# Patient Record
Sex: FEMALE | Race: BLACK OR AFRICAN AMERICAN | Employment: FULL TIME | URBAN - METROPOLITAN AREA
[De-identification: names, ages, dates, MRNs, and addresses within clinical notes are randomized per-mention and may not be internally consistent; named-entity substitution may affect disease eponyms.]

---

## 2017-08-09 ENCOUNTER — OFFICE VISIT (OUTPATIENT)
Dept: FAMILY MEDICINE CLINIC | Age: 42
End: 2017-08-09

## 2017-08-09 VITALS
WEIGHT: 128 LBS | HEIGHT: 64 IN | TEMPERATURE: 98.5 F | HEART RATE: 78 BPM | DIASTOLIC BLOOD PRESSURE: 70 MMHG | OXYGEN SATURATION: 98 % | RESPIRATION RATE: 16 BRPM | SYSTOLIC BLOOD PRESSURE: 102 MMHG | BODY MASS INDEX: 21.85 KG/M2

## 2017-08-09 DIAGNOSIS — L93.0 DISCOID LUPUS ERYTHEMATOSUS: Primary | ICD-10-CM

## 2017-08-09 DIAGNOSIS — D68.61 ANTI-PHOSPHOLIPID ANTIBODY SYNDROME (HCC): ICD-10-CM

## 2017-08-09 DIAGNOSIS — K31.84 GASTROPARESIS: ICD-10-CM

## 2017-08-09 DIAGNOSIS — K21.9 GASTROESOPHAGEAL REFLUX DISEASE WITHOUT ESOPHAGITIS: ICD-10-CM

## 2017-08-09 NOTE — LETTER
NOTIFICATION RETURN TO WORK / SCHOOL 
 
8/9/2017 12:25 PM 
 
Ms. Patti Gandhi [address] [address] To Whom It May Concern: 
 
Patti Gandhi is currently under the care of [address] [place] [address]. She was seen in our office today. Patient is being referred out of our office for evaluation and treatment for medical issues. At this time patient to be placed on light duty until further notice. If there are questions or concerns please have the patient contact our office. Sincerely, Meredith Hurley MD

## 2017-08-09 NOTE — LETTER
NOTIFICATION RETURN TO WORK / SCHOOL 
 
8/9/2017 12:20 PM 
 
Ms. Jeannette Sylvester [address] [address] To Whom It May Concern: 
 
Jeannette Sylvester is currently under the care of [address] [place] [address]. She has been seen our office today, Patient is being referred out of our office for evaluation and treatment for gastro issues. Patient may need to be put on light duty. If there are questions or concerns please have the patient contact our office. Sincerely, Eleanor Renteria MD

## 2017-08-09 NOTE — PROGRESS NOTES
Jeannette Sylvester is a 39 y.o. female  Here today with complaints of weight loss. 1. Have you been to the ER, urgent care clinic since your last visit? Hospitalized since your last visit? No    2. Have you seen or consulted any other health care providers outside of the [address] [place] [address] since your last visit? Include any pap smears or colon screening.  No

## 2017-08-09 NOTE — LETTER
NOTIFICATION RETURN TO WORK / SCHOOL 
 
8/9/2017 2:03 PM 
 
Ms. Severino Verde [address] [address] To Whom It May Concern: 
 
Severino Verde is currently under the care of [address] [place] [address]. She was seen in our office today. Patient is being referred out of our office for evaluation and treatment for medical issues. At this time patient is not to have customer contact until 08/17/17. If there are questions or concerns please have the patient contact our office. Sincerely, Lauro Hartman MD

## 2017-08-09 NOTE — PROGRESS NOTES
Assessment/Plan:    *Diagnoses and all orders for this visit:    1. Discoid lupus erythematosus  -     REFERRAL TO RHEUMATOLOGY  -     CBC WITH AUTOMATED DIFF; Future  -     HEPATIC FUNCTION PANEL; Future  -     LIPID PANEL; Future  -     METABOLIC PANEL, BASIC; Future  -     TSH 3RD GENERATION; Future  -     T4, FREE; Future  -     URINALYSIS W/ RFLX MICROSCOPIC; Future  -     VITAMIN D, 25 HYDROXY; Future    2. Anti-phospholipid antibody syndrome (HCC)  -     REFERRAL TO RHEUMATOLOGY    3. Gastroesophageal reflux disease without esophagitis  -     REFERRAL TO GASTROENTEROLOGY  -     CBC WITH AUTOMATED DIFF; Future  -     HEPATIC FUNCTION PANEL; Future  -     LIPID PANEL; Future  -     METABOLIC PANEL, BASIC; Future  -     TSH 3RD GENERATION; Future  -     T4, FREE; Future  -     URINALYSIS W/ RFLX MICROSCOPIC; Future  -     VITAMIN D, 25 HYDROXY; Future    4. Gastroparesis  -     REFERRAL TO GASTROENTEROLOGY  -     CBC WITH AUTOMATED DIFF; Future  -     HEPATIC FUNCTION PANEL; Future  -     LIPID PANEL; Future  -     METABOLIC PANEL, BASIC; Future  -     TSH 3RD GENERATION; Future  -     T4, FREE; Future  -     URINALYSIS W/ RFLX MICROSCOPIC; Future  -     VITAMIN D, 25 HYDROXY; Future        At the end of the visit, she produced a form that needs completion for her job. She works for [employer] and when her health issues are acting up, she finds it difficult to function in her role at work. Is asking for a letter stating that she may need light duty. The plan was discussed with the patient. The patient verbalized understanding and is in agreement with the plan.   All medication potential side effects were discussed with the patient.    -------------------------------------------------------------------------------------------------------------------        Linda Sanchez is a 39 y.o. female and presents with Weight Loss and Decreased Appetite         Subjective:  Pt returns to us after last being seen in Mar 2016.   She has been struggling with unintentional weight loss, poor appetite, +nausea and all has been more noticeable the last 3 months. She has prior dx of gastroparesis and reflux. She has not been seeing a gastroenterologist at all, in fact we referred her in Oct 2015 and she never followed through. She can not tolerate much food. She feels like she has to force herself to eat b/c otherwise, she would rather not eat. Also has dx of Lupus. Has started feeling tired, aching in her joints and is concerned her Lupus is \"acting up\". ROS:  Constitutional: No recent weight change. No weakness/fatigue. No f/c. Skin: No rashes, change in nails/hair, itching   HENT: No HA, dizziness. No hearing loss/tinnitus. No nasal congestion/discharge. Eyes: No change in vision, double/blurred vision or eye pain/redness. Cardiovascular: No CP/palpitations. No MARTIN/orthopnea/PND. Respiratory: No cough/sputum, dyspnea, wheezing. Gastointestinal: No dysphagia, reflux. No n/v. No constipation/diarrhea. No melena/rectal bleeding. Genitourinary: No dysuria, urinary hesitancy, nocturia, hematuria. No incontinence. Musculoskeletal: No joint pain/stiffness. No muscle pain/tenderness. Endo: No heat/cold intolerance, no polyuria/polydypsia. Heme: No h/o anemia. No easy bleeding/bruising. Allergy/Immunology: No seasonal rhinitis. Denies frequent colds, sinus/ear infections. Neurological: No seizures/numbness/weakness. No paresthesias. Psychiatric:  No depression, anxiety. The problem list was updated as a part of today's visit. Patient Active Problem List   Diagnosis Code    Discoid lupus erythematosus L93.0    GERD (gastroesophageal reflux disease) K21.9    Anti-phospholipid antibody syndrome (HCC) D68.61    Gastroparesis K31.84       The PSH, FH were reviewed.         SH:  Social History   Substance Use Topics    Smoking status: Former Smoker     Packs/day: 0.25     Types: Cigarettes Start date: 1/28/2009     Quit date: 1/28/2011    Smokeless tobacco: Never Used    Alcohol use Yes      Comment: socially       Medications/Allergies:  Current Outpatient Prescriptions on File Prior to Visit   Medication Sig Dispense Refill    HYDROcodone-acetaminophen ([place]) 5-325 mg per tablet Take  by mouth every six (6) hours as needed for Pain.  cyclobenzaprine (FLEXERIL) 10 mg tablet Take  by mouth nightly.  tobramycin (TOBREX) 0.3 % ophthalmic solution Administer 1 Drop to both eyes every six (6) hours. Indications: BACTERIAL CONJUNCTIVITIS 1 Bottle 0    omeprazole (PRILOSEC) 40 mg capsule Take 1 Cap by mouth daily. 30 Cap 5    ergocalciferol (ERGOCALCIFEROL) 50,000 unit capsule Take 1 Cap by mouth every seven (7) days. 16 Cap 0    guaiFENesin-codeine (ROBITUSSIN AC)  mg/5 mL solution Take 5 mL by mouth three (3) times daily as needed for Cough. 120 mL 0     No current facility-administered medications on file prior to visit. Allergies   Allergen Reactions    Ibuprofen Rash         Health Maintenance:   Health Maintenance   Topic Date Due    DTaP/Tdap/Td series (1 - Tdap) 12/14/1996    PAP AKA CERVICAL CYTOLOGY  12/14/1996    INFLUENZA AGE 9 TO ADULT  08/01/2017       Objective:  Visit Vitals    /70    Pulse 78    Temp 98.5 °F (36.9 °C)    Resp 16    Ht 5' 4\" (1.626 m)    Wt 128 lb (58.1 kg)    LMP 07/17/2017    SpO2 98%    BMI 21.97 kg/m2          Nurses notes and VS reviewed.       Physical Examination: General appearance - alert, well appearing, and in no distress  Chest - clear to auscultation, no wheezes, rales or rhonchi, symmetric air entry  Heart - normal rate, regular rhythm, normal S1, S2, no murmurs, rubs, clicks or gallops  Abdomen - soft, nontender, nondistended, no masses or organomegaly        Labwork and Ancillary Studies:    CBC w/Diff  Lab Results   Component Value Date/Time    WBC 5.8 11/02/2015 11:57 AM    HGB 12.0 11/02/2015 11:57 AM PLATELET 847 95/39/1547 11:57 AM         Basic Metabolic Profile  Lab Results   Component Value Date/Time    Sodium 140 11/02/2015 11:57 AM    Potassium 4.0 11/02/2015 11:57 AM    Chloride 103 11/02/2015 11:57 AM    CO2 24 11/02/2015 11:57 AM    Anion gap 13.0 11/02/2015 11:57 AM    Glucose 88 11/02/2015 11:57 AM    BUN 9 11/02/2015 11:57 AM    Creatinine 0.5 11/02/2015 11:57 AM    Calcium 9.3 11/02/2015 11:57 AM         LFT  Lab Results   Component Value Date/Time    ALT (SGPT) 12 11/02/2015 11:57 AM    AST (SGOT) 15 11/02/2015 11:57 AM    Alk.  phosphatase 48 11/02/2015 11:57 AM    Bilirubin, direct <0.2 11/02/2015 11:57 AM    Bilirubin, total 0.2 11/02/2015 11:57 AM         Cholesterol  Lab Results   Component Value Date/Time    Cholesterol, total 168 11/02/2015 11:57 AM    HDL Cholesterol 63 11/02/2015 11:57 AM    LDL, calculated 95 11/02/2015 11:57 AM    Triglyceride 52 11/02/2015 11:57 AM

## 2017-08-10 ENCOUNTER — HOSPITAL ENCOUNTER (OUTPATIENT)
Dept: LAB | Age: 42
Discharge: HOME | End: 2017-08-10
Payer: COMMERCIAL

## 2017-08-10 DIAGNOSIS — L93.0 DISCOID LUPUS ERYTHEMATOSUS: ICD-10-CM

## 2017-08-10 DIAGNOSIS — K21.9 GASTROESOPHAGEAL REFLUX DISEASE WITHOUT ESOPHAGITIS: ICD-10-CM

## 2017-08-10 DIAGNOSIS — K31.84 GASTROPARESIS: ICD-10-CM

## 2017-08-10 DIAGNOSIS — D68.61 ANTI-PHOSPHOLIPID ANTIBODY SYNDROME (HCC): ICD-10-CM

## 2017-08-10 DIAGNOSIS — D68.61 ANTI-PHOSPHOLIPID ANTIBODY SYNDROME (HCC): Primary | ICD-10-CM

## 2017-08-10 LAB
25(OH)D3 SERPL-MCNC: 18.8 NG/ML (ref 30–100)
ALBUMIN SERPL BCP-MCNC: 3.8 G/DL (ref 3.4–5)
ALBUMIN/GLOB SERPL: 0.9 {RATIO} (ref 0.8–1.7)
ALP SERPL-CCNC: 55 U/L (ref 45–117)
ALT SERPL-CCNC: 31 U/L (ref 13–56)
ANION GAP BLD CALC-SCNC: 7 MMOL/L (ref 3–18)
APPEARANCE UR: ABNORMAL
AST SERPL W P-5'-P-CCNC: 16 U/L (ref 15–37)
BACTERIA URNS QL MICRO: ABNORMAL /HPF
BASOPHILS # BLD AUTO: 0 K/UL (ref 0–0.06)
BASOPHILS # BLD: 1 % (ref 0–2)
BILIRUB DIRECT SERPL-MCNC: <0.1 MG/DL (ref 0–0.2)
BILIRUB SERPL-MCNC: 0.2 MG/DL (ref 0.2–1)
BILIRUB UR QL: NEGATIVE
BUN SERPL-MCNC: 9 MG/DL (ref 7–18)
BUN/CREAT SERPL: 13 (ref 12–20)
CALCIUM SERPL-MCNC: 9.1 MG/DL (ref 8.5–10.1)
CHLORIDE SERPL-SCNC: 102 MMOL/L (ref 100–108)
CHOLEST SERPL-MCNC: 148 MG/DL
CO2 SERPL-SCNC: 28 MMOL/L (ref 21–32)
COLOR UR: YELLOW
CREAT SERPL-MCNC: 0.68 MG/DL (ref 0.6–1.3)
DIFFERENTIAL METHOD BLD: ABNORMAL
EOSINOPHIL # BLD: 0.1 K/UL (ref 0–0.4)
EOSINOPHIL NFR BLD: 2 % (ref 0–5)
EPITH CASTS URNS QL MICRO: ABNORMAL /LPF (ref 0–5)
ERYTHROCYTE [DISTWIDTH] IN BLOOD BY AUTOMATED COUNT: 17.9 % (ref 11.6–14.5)
GLOBULIN SER CALC-MCNC: 4.1 G/DL (ref 2–4)
GLUCOSE SERPL-MCNC: 83 MG/DL (ref 74–99)
GLUCOSE UR STRIP.AUTO-MCNC: NEGATIVE MG/DL
HCT VFR BLD AUTO: 35.7 % (ref 35–45)
HDLC SERPL-MCNC: 52 MG/DL (ref 40–60)
HDLC SERPL: 2.8 {RATIO} (ref 0–5)
HGB BLD-MCNC: 10.6 G/DL (ref 12–16)
HGB UR QL STRIP: ABNORMAL
KETONES UR QL STRIP.AUTO: NEGATIVE MG/DL
LDLC SERPL CALC-MCNC: 86.4 MG/DL (ref 0–100)
LEUKOCYTE ESTERASE UR QL STRIP.AUTO: ABNORMAL
LIPID PROFILE,FLP: NORMAL
LYMPHOCYTES # BLD AUTO: 29 % (ref 21–52)
LYMPHOCYTES # BLD: 1.9 K/UL (ref 0.9–3.6)
MCH RBC QN AUTO: 22.1 PG (ref 24–34)
MCHC RBC AUTO-ENTMCNC: 29.7 G/DL (ref 31–37)
MCV RBC AUTO: 74.4 FL (ref 74–97)
MONOCYTES # BLD: 0.7 K/UL (ref 0.05–1.2)
MONOCYTES NFR BLD AUTO: 11 % (ref 3–10)
MUCOUS THREADS URNS QL MICRO: ABNORMAL /LPF
NEUTS SEG # BLD: 3.7 K/UL (ref 1.8–8)
NEUTS SEG NFR BLD AUTO: 57 % (ref 40–73)
NITRITE UR QL STRIP.AUTO: NEGATIVE
PH UR STRIP: 5.5 [PH] (ref 5–8)
PLATELET # BLD AUTO: 278 K/UL (ref 135–420)
PMV BLD AUTO: 11.3 FL (ref 9.2–11.8)
POTASSIUM SERPL-SCNC: 4.2 MMOL/L (ref 3.5–5.5)
PROT SERPL-MCNC: 7.9 G/DL (ref 6.4–8.2)
PROT UR STRIP-MCNC: ABNORMAL MG/DL
RBC # BLD AUTO: 4.8 M/UL (ref 4.2–5.3)
RBC #/AREA URNS HPF: ABNORMAL /HPF (ref 0–5)
SODIUM SERPL-SCNC: 137 MMOL/L (ref 136–145)
SP GR UR REFRACTOMETRY: 1.03 (ref 1–1.03)
T4 FREE SERPL-MCNC: 1.1 NG/DL (ref 0.7–1.5)
TRIGL SERPL-MCNC: 48 MG/DL (ref ?–150)
TSH SERPL DL<=0.05 MIU/L-ACNC: 1 UIU/ML (ref 0.36–3.74)
UROBILINOGEN UR QL STRIP.AUTO: 0.2 EU/DL (ref 0.2–1)
VLDLC SERPL CALC-MCNC: 9.6 MG/DL
WBC # BLD AUTO: 6.4 K/UL (ref 4.6–13.2)
WBC URNS QL MICRO: ABNORMAL /HPF (ref 0–4)

## 2017-08-10 PROCEDURE — 80076 HEPATIC FUNCTION PANEL: CPT | Performed by: INTERNAL MEDICINE

## 2017-08-10 PROCEDURE — 86225 DNA ANTIBODY NATIVE: CPT | Performed by: INTERNAL MEDICINE

## 2017-08-10 PROCEDURE — 86235 NUCLEAR ANTIGEN ANTIBODY: CPT | Performed by: INTERNAL MEDICINE

## 2017-08-10 PROCEDURE — 80048 BASIC METABOLIC PNL TOTAL CA: CPT | Performed by: INTERNAL MEDICINE

## 2017-08-10 PROCEDURE — 85025 COMPLETE CBC W/AUTO DIFF WBC: CPT | Performed by: INTERNAL MEDICINE

## 2017-08-10 PROCEDURE — 82306 VITAMIN D 25 HYDROXY: CPT | Performed by: INTERNAL MEDICINE

## 2017-08-10 PROCEDURE — 84439 ASSAY OF FREE THYROXINE: CPT | Performed by: INTERNAL MEDICINE

## 2017-08-10 PROCEDURE — 36415 COLL VENOUS BLD VENIPUNCTURE: CPT | Performed by: INTERNAL MEDICINE

## 2017-08-10 PROCEDURE — 84443 ASSAY THYROID STIM HORMONE: CPT | Performed by: INTERNAL MEDICINE

## 2017-08-10 PROCEDURE — 86146 BETA-2 GLYCOPROTEIN ANTIBODY: CPT | Performed by: INTERNAL MEDICINE

## 2017-08-10 PROCEDURE — 80061 LIPID PANEL: CPT | Performed by: INTERNAL MEDICINE

## 2017-08-10 RX ORDER — OMEPRAZOLE 40 MG/1
40 CAPSULE, DELAYED RELEASE ORAL DAILY
Qty: 30 CAP | Refills: 5 | Status: SHIPPED | OUTPATIENT
Start: 2017-08-10

## 2017-08-11 LAB
CENTROMERE B AB SER-ACNC: <0.2 AI (ref 0–0.9)
CHROMATIN AB SERPL-ACNC: 0.3 AI (ref 0–0.9)
DSDNA AB SER-ACNC: <1 IU/ML (ref 0–9)
DSDNA AB SER-ACNC: <1 IU/ML (ref 0–9)
ENA JO1 AB SER-ACNC: <0.2 AI (ref 0–0.9)
ENA RNP AB SER-ACNC: 0.5 AI (ref 0–0.9)
ENA SCL70 AB SER-ACNC: <0.2 AI (ref 0–0.9)
ENA SM AB SER-ACNC: 0.2 AI (ref 0–0.9)
ENA SM AB SER-ACNC: <0.2 AI (ref 0–0.9)
ENA SM+RNP AB SER-ACNC: 0.4 AI (ref 0–0.9)
ENA SS-A AB SER-ACNC: 0.5 AI (ref 0–0.9)
ENA SS-B AB SER-ACNC: <0.2 AI (ref 0–0.9)
RIBOSOMAL P AB SER-ACNC: <0.2 AI (ref 0–0.9)
SEE BELOW:, 164879: NORMAL

## 2017-08-14 NOTE — PROGRESS NOTES
Notify pt I am still waiting on the anti-phospholipid results but her Lupus labs appear to be in normal range. Her Vit d is low. Start RX Vit d 50,000 units q week x 4 months THEN transition to OTC 1000 units daily. Pt is also anemic. Should start some iron, ferrous sulfate 325 mg BID. We have referred her to rheumatology and GI. Please send these labs to them.

## 2017-08-14 NOTE — PROGRESS NOTES
Has some blood in urine with WBCs and bacteria. I would like her on an Abx x 7 days, Cipro 250 mg BID.

## 2017-08-15 RX ORDER — ERGOCALCIFEROL 1.25 MG/1
50000 CAPSULE ORAL
Qty: 4 CAP | Refills: 3 | Status: SHIPPED | OUTPATIENT
Start: 2017-08-15 | End: 2017-08-17 | Stop reason: SDUPTHER

## 2017-08-15 RX ORDER — PROMETHAZINE HYDROCHLORIDE 25 MG/1
25 TABLET ORAL
Qty: 40 TAB | Refills: 0 | Status: SHIPPED | OUTPATIENT
Start: 2017-08-15 | End: 2019-02-01

## 2017-08-15 RX ORDER — CIPROFLOXACIN 250 MG/1
250 TABLET, FILM COATED ORAL EVERY 12 HOURS
Qty: 14 TAB | Refills: 0 | Status: SHIPPED | OUTPATIENT
Start: 2017-08-15 | End: 2017-08-22

## 2017-08-15 RX ORDER — FERROUS SULFATE 325(65) MG
325 TABLET, DELAYED RELEASE (ENTERIC COATED) ORAL 2 TIMES DAILY
Qty: 60 TAB | Refills: 2 | Status: SHIPPED | OUTPATIENT
Start: 2017-08-15 | End: 2019-02-01

## 2017-08-15 NOTE — TELEPHONE ENCOUNTER
Patient is having issue's with nausea and it is hard for her to eat anything is following a bland diet

## 2017-08-17 ENCOUNTER — HOSPITAL ENCOUNTER (OUTPATIENT)
Dept: LAB | Age: 42
Discharge: HOME | End: 2017-08-17
Payer: COMMERCIAL

## 2017-08-17 ENCOUNTER — OFFICE VISIT (OUTPATIENT)
Dept: FAMILY MEDICINE CLINIC | Age: 42
End: 2017-08-17

## 2017-08-17 VITALS
BODY MASS INDEX: 22.09 KG/M2 | TEMPERATURE: 97.9 F | HEIGHT: 64 IN | RESPIRATION RATE: 20 BRPM | HEART RATE: 72 BPM | WEIGHT: 129.4 LBS | SYSTOLIC BLOOD PRESSURE: 98 MMHG | DIASTOLIC BLOOD PRESSURE: 70 MMHG | OXYGEN SATURATION: 99 %

## 2017-08-17 DIAGNOSIS — K31.84 GASTROPARESIS: Primary | ICD-10-CM

## 2017-08-17 DIAGNOSIS — K21.9 GASTROESOPHAGEAL REFLUX DISEASE WITHOUT ESOPHAGITIS: ICD-10-CM

## 2017-08-17 DIAGNOSIS — D68.61 ANTI-PHOSPHOLIPID ANTIBODY SYNDROME (HCC): ICD-10-CM

## 2017-08-17 DIAGNOSIS — L93.0 DISCOID LUPUS ERYTHEMATOSUS: ICD-10-CM

## 2017-08-17 DIAGNOSIS — D68.61 ANTI-PHOSPHOLIPID ANTIBODY SYNDROME (HCC): Primary | ICD-10-CM

## 2017-08-17 PROCEDURE — 86146 BETA-2 GLYCOPROTEIN ANTIBODY: CPT | Performed by: INTERNAL MEDICINE

## 2017-08-17 PROCEDURE — 36415 COLL VENOUS BLD VENIPUNCTURE: CPT | Performed by: INTERNAL MEDICINE

## 2017-08-17 NOTE — PROGRESS NOTES
Chief Complaint   Patient presents with    Other     Discoid lupus erythematosus     Letter     ADA       1. Have you been to the ER, urgent care clinic since your last visit? Hospitalized since your last visit? No    2. Have you seen or consulted any other health care providers outside of the [address] [place] [address] since your last visit? Include any pap smears or colon screening.  No

## 2017-08-17 NOTE — PROGRESS NOTES
Checked with [facility] they didn't receive correct collection of plasma patient has returned to lab for recollection

## 2017-08-17 NOTE — PROGRESS NOTES
Can you just check with lab, why does it say \"test not performed\"?   Are they still running the test?

## 2017-08-17 NOTE — PROGRESS NOTES
Assessment/Plan:    *Diagnoses and all orders for this visit:    1. Gastroparesis    2. Gastroesophageal reflux disease without esophagitis    3. Discoid lupus erythematosus    4. Anti-phospholipid antibody syndrome (HCC)      Form was completed. Patient still needed to complete her part so took it with her. The plan was discussed with the patient. The patient verbalized understanding and is in agreement with the plan. All medication potential side effects were discussed with the patient.    -------------------------------------------------------------------------------------------------------------------        Anuj Bundy is a 39 y.o. female and presents with Other (Discoid lupus erythematosus ) and Letter (ADA)         Subjective:  Pt returns today to have paperwork completed. She has not been feeling well, dealing with gastroparesis, reflux and Lupus. 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 She needs the form to support her need to be on lighter duty, to avoid her having direct customer contact. ROS:  Constitutional: No recent weight change. No weakness/fatigue. No f/c. Skin: No rashes, change in nails/hair, itching   HENT: No HA, dizziness. No hearing loss/tinnitus. No nasal congestion/discharge. Eyes: No change in vision, double/blurred vision or eye pain/redness. Cardiovascular: No CP/palpitations. No MARTIN/orthopnea/PND. Respiratory: No cough/sputum, dyspnea, wheezing. Gastointestinal: No dysphagia, reflux. No n/v. No constipation/diarrhea. No melena/rectal bleeding. Genitourinary: No dysuria, urinary hesitancy, nocturia, hematuria. No incontinence. Musculoskeletal: No joint pain/stiffness. No muscle pain/tenderness. Endo: No heat/cold intolerance, no polyuria/polydypsia. Heme: No h/o anemia. No easy bleeding/bruising. Allergy/Immunology: No seasonal rhinitis.  Denies frequent colds, sinus/ear infections. Neurological: No seizures/numbness/weakness. No paresthesias. Psychiatric:  No depression, anxiety. The problem list was updated as a part of today's visit. Patient Active Problem List   Diagnosis Code    Discoid lupus erythematosus L93.0    GERD (gastroesophageal reflux disease) K21.9    Anti-phospholipid antibody syndrome (HCC) D68.61    Gastroparesis K31.84       The PSH, FH were reviewed. SH:  Social History   Substance Use Topics    Smoking status: Former Smoker     Packs/day: 0.25     Types: Cigarettes     Start date: 1/28/2009     Quit date: 1/28/2011    Smokeless tobacco: Never Used    Alcohol use Yes      Comment: socially       Medications/Allergies:  Current Outpatient Prescriptions on File Prior to Visit   Medication Sig Dispense Refill    ciprofloxacin HCl (CIPRO) 250 mg tablet Take 1 Tab by mouth every twelve (12) hours for 7 days. 14 Tab 0    promethazine (PHENERGAN) 25 mg tablet Take 1 Tab by mouth every eight (8) hours as needed for Nausea. 40 Tab 0    omeprazole (PRILOSEC) 40 mg capsule Take 1 Cap by mouth daily. 30 Cap 5    ergocalciferol (ERGOCALCIFEROL) 50,000 unit capsule Take 1 Cap by mouth every seven (7) days. 16 Cap 0    ferrous sulfate (IRON) 325 mg (65 mg iron) EC tablet Take 1 Tab by mouth two (2) times a day. 60 Tab 2    HYDROcodone-acetaminophen (NORCO) 5-325 mg per tablet Take  by mouth every six (6) hours as needed for Pain.  cyclobenzaprine (FLEXERIL) 10 mg tablet Take  by mouth nightly.  tobramycin (TOBREX) 0.3 % ophthalmic solution Administer 1 Drop to both eyes every six (6) hours. Indications: BACTERIAL CONJUNCTIVITIS 1 Bottle 0     No current facility-administered medications on file prior to visit.          Allergies   Allergen Reactions    Ibuprofen Rash         Health Maintenance:   Health Maintenance   Topic Date Due    DTaP/Tdap/Td series (1 - Tdap) 12/14/1996    PAP AKA CERVICAL CYTOLOGY  12/14/1996    INFLUENZA AGE 9 TO ADULT  08/01/2017       Objective:  Visit Vitals    BP 98/70 (BP 1 Location: Right arm, BP Patient Position: Sitting)    Pulse 72    Temp 97.9 °F (36.6 °C) (Oral)    Resp 20    Ht 5' 4\" (1.626 m)    Wt 129 lb 6.4 oz (58.7 kg)    LMP 08/15/2017    SpO2 99%    BMI 22.21 kg/m2          Nurses notes and VS reviewed. Physical Examination: General appearance - alert, well appearing, and in no distress        Labwork and Ancillary Studies:    CBC w/Diff  Lab Results   Component Value Date/Time    WBC 6.4 08/10/2017 10:41 AM    HGB 10.6 08/10/2017 10:41 AM    PLATELET 017 56/07/3431 10:41 AM         Basic Metabolic Profile  Lab Results   Component Value Date/Time    Sodium 137 08/10/2017 10:41 AM    Potassium 4.2 08/10/2017 10:41 AM    Chloride 102 08/10/2017 10:41 AM    CO2 28 08/10/2017 10:41 AM    Anion gap 7 08/10/2017 10:41 AM    Glucose 83 08/10/2017 10:41 AM    BUN 9 08/10/2017 10:41 AM    Creatinine 0.68 08/10/2017 10:41 AM    BUN/Creatinine ratio 13 08/10/2017 10:41 AM    GFR est AA >60 08/10/2017 10:41 AM    GFR est non-AA >60 08/10/2017 10:41 AM    Calcium 9.1 08/10/2017 10:41 AM         LFT  Lab Results   Component Value Date/Time    ALT (SGPT) 31 08/10/2017 10:41 AM    AST (SGOT) 16 08/10/2017 10:41 AM    Alk.  phosphatase 55 08/10/2017 10:41 AM    Bilirubin, direct <0.1 08/10/2017 10:41 AM    Bilirubin, total 0.2 08/10/2017 10:41 AM         Cholesterol  Lab Results   Component Value Date/Time    Cholesterol, total 148 08/10/2017 10:41 AM    HDL Cholesterol 52 08/10/2017 10:41 AM    LDL, calculated 86.4 08/10/2017 10:41 AM    Triglyceride 48 08/10/2017 10:41 AM    CHOL/HDL Ratio 2.8 08/10/2017 10:41 AM

## 2017-08-18 ENCOUNTER — TELEPHONE (OUTPATIENT)
Dept: FAMILY MEDICINE CLINIC | Age: 42
End: 2017-08-18

## 2017-08-18 NOTE — TELEPHONE ENCOUNTER
Patient called needs letter written last time needs dates changed to two weeks out paperwork done yesterday goes to a different department

## 2017-08-23 LAB
APTT HEX PL PPP: 3 SEC
APTT IMM NP PPP: NORMAL SEC
APTT PPP 1:1 SALINE: NORMAL SEC
APTT PPP: 26 SEC
B2 GLYCOPROT1 IGA SER-ACNC: <10 SAU
B2 GLYCOPROT1 IGG SER-ACNC: <10 SGU
B2 GLYCOPROT1 IGM SER-ACNC: <10 SMU
CARDIOLIPIN IGA SER IA-ACNC: <10 APL
CARDIOLIPIN IGG SER IA-ACNC: <10 GPL
CARDIOLIPIN IGM SER IA-ACNC: <10 MPL
CONFIRM DRVVT: NORMAL SEC
LA NT PLATELET PPP: 0 SEC
LAC INTERPRETATION, 502038: NORMAL
PROTHROM IGG SERPL-ACNC: 6 G UNITS
PS IGG SER IA-ACNC: 5 GPS
PS IGM SER IA-ACNC: 10 MPS
SCREEN DRVVT/NORMAL: NORMAL RATIO
SCREEN DRVVT: 27.7 SEC

## 2017-08-23 NOTE — PROGRESS NOTES
Notify pt her anti-phospholipid results are normal.  The rheumatologist will discuss this further with her. Did she make an appt yet?

## 2017-08-24 NOTE — PROGRESS NOTES
Patient notified she voiced understanding scheduled her with rheumatology for tomorrow at 8:15 am dr Fernanda Andrews patient given info in office copy of labs faxed to his office

## 2019-02-01 ENCOUNTER — OFFICE VISIT (OUTPATIENT)
Dept: FAMILY MEDICINE CLINIC | Age: 44
End: 2019-02-01

## 2019-02-01 VITALS
HEIGHT: 64 IN | SYSTOLIC BLOOD PRESSURE: 105 MMHG | DIASTOLIC BLOOD PRESSURE: 66 MMHG | WEIGHT: 123 LBS | RESPIRATION RATE: 18 BRPM | HEART RATE: 80 BPM | TEMPERATURE: 98 F | OXYGEN SATURATION: 92 % | BODY MASS INDEX: 21 KG/M2

## 2019-02-01 DIAGNOSIS — R53.83 MALAISE AND FATIGUE: Primary | ICD-10-CM

## 2019-02-01 DIAGNOSIS — R53.81 MALAISE AND FATIGUE: Primary | ICD-10-CM

## 2019-02-01 NOTE — PROGRESS NOTES
Severino Verde is a 37 y.o. female (: 1975) presenting to address: Chief Complaint Patient presents with  Fatigue  
  patient declined flu shot  
 Headache  
  x few weeks     pain scale 6/10 Vitals:  
 19 1442 BP: 105/66 Pulse: 80 Resp: 18 Temp: 98 °F (36.7 °C) TempSrc: Oral  
SpO2: 92% Weight: 123 lb (55.8 kg) Height: 5' 4\" (1.626 m) PainSc:   6 PainLoc: Head LMP: 2019 Hearing/Vision: No exam data present Learning Assessment:  
 
Learning Assessment 2014 PRIMARY LEARNER Patient PRIMARY LANGUAGE ENGLISH  
LEARNER PREFERENCE PRIMARY DEMONSTRATION  
ANSWERED BY patient RELATIONSHIP SELF Depression Screening: PHQ over the last two weeks 2019 Little interest or pleasure in doing things Not at all Feeling down, depressed, irritable, or hopeless Not at all Total Score PHQ 2 0 Fall Risk Assessment:  
No flowsheet data found. Abuse Screening: No flowsheet data found. Coordination of Care Questionaire: 1. Have you been to the ER, urgent care clinic since your last visit? Hospitalized since your last visit? NO 
 
2. Have you seen or consulted any other health care providers outside of the [address] [place] [address] since your last visit? Include any pap smears or colon screening. [facility] Advanced Directive: 1. Do you have an Advanced Directive? NO 
 
2. Would you like information on Advanced Directives?  NO

## 2019-02-01 NOTE — PATIENT INSTRUCTIONS
Rest, increase fluids Return for lab Schedule lab review and follow up with Dr. Abbi Booker Fatigue: Care Instructions Your Care Instructions Fatigue is a feeling of tiredness, exhaustion, or lack of energy. You may feel fatigue because of too much or not enough activity. It can also come from stress, lack of sleep, boredom, and poor diet. Many medical problems, such as viral infections, can cause fatigue. Emotional problems, especially depression, are often the cause of fatigue. Fatigue is most often a symptom of another problem. Treatment for fatigue depends on the cause. For example, if you have fatigue because you have a certain health problem, treating this problem also treats your fatigue. If depression or anxiety is the cause, treatment may help. Follow-up care is a key part of your treatment and safety. Be sure to make and go to all appointments, and call your doctor if you are having problems. It's also a good idea to know your test results and keep a list of the medicines you take. How can you care for yourself at home? · Get regular exercise. But don't overdo it. Go back and forth between rest and exercise. · Get plenty of rest. 
· Eat a healthy diet. Do not skip meals, especially breakfast. 
· Reduce your use of caffeine, tobacco, and alcohol. Caffeine is most often found in coffee, tea, cola drinks, and chocolate. · Limit medicines that can cause fatigue. This includes tranquilizers and cold and allergy medicines. When should you call for help? Watch closely for changes in your health, and be sure to contact your doctor if: 
  · You have new symptoms such as fever or a rash.  
  · Your fatigue gets worse.  
  · You have been feeling down, depressed, or hopeless. Or you may have lost interest in things that you usually enjoy.  
  · You are not getting better as expected. Where can you learn more? Go to http://trudi-maya.info/. Enter C220 in the search box to learn more about \"Fatigue: Care Instructions. \" Current as of: September 23, 2018 Content Version: 11.9 © 1060-2885 [employer]. Care instructions adapted under license by [employer] (which disclaims liability or warranty for this information). If you have questions about a medical condition or this instruction, always ask your healthcare professional. [address] [address] any warranty or liability for your use of this information.

## 2019-02-01 NOTE — PROGRESS NOTES
HISTORY OF PRESENT ILLNESS Anuj Bundy is a 37 y.o. female. Fatigue The history is provided by the patient and medical records. Associated symptoms include headaches (occipital x 2 weeks). Pertinent negatives include no chest pain and no shortness of breath. Headache Associated symptoms include headaches (occipital x 2 weeks). Pertinent negatives include no chest pain and no shortness of breath. Hypotension The history is provided by the patient and medical records. This is a chronic problem. Episode onset: about 2 weeks ago-noted in surgery office, but previously noted here. Associated symptoms include headaches (occipital x 2 weeks). Pertinent negatives include no chest pain and no shortness of breath. Patient Active Problem List  
Diagnosis Code  Discoid lupus erythematosus L93.0  GERD (gastroesophageal reflux disease) K21.9  Anti-phospholipid antibody syndrome (HCC) D68.61  Gastroparesis K31.84 Current Outpatient Medications:  
  omeprazole (PRILOSEC) 40 mg capsule, Take 1 Cap by mouth daily. , Disp: 30 Cap, Rfl: 5 
  ergocalciferol (ERGOCALCIFEROL) 50,000 unit capsule, Take 1 Cap by mouth every seven (7) days. , Disp: 16 Cap, Rfl: 0 Review of Systems Constitutional: Positive for fatigue and malaise/fatigue (x 2 weeks). HENT: Positive for ear pain (transient right ear pain). Eyes: Negative for blurred vision and photophobia. Respiratory: Negative for shortness of breath. Plerutic pain in December Cardiovascular: Negative for chest pain and palpitations. Reports a blood pressure with a systolic in the 21I measured in her surgeon's office recently Denies orthostatic symptoms Gastrointestinal: Positive for nausea and vomiting. Nausea and vomiting associated with gastroparesis - no change from patient's baseline Neurological: Positive for headaches (occipital x 2 weeks). Negative for dizziness. Hard to concentrate Psychiatric/Behavioral: Negative for depression. The patient is not nervous/anxious and does not have insomnia. Acknowledges increased stress associated with discovery of breast mass, although biopsy shows benign lesion-still concerned about surgery scheduled for excision of the mass Visit Vitals /66 (BP 1 Location: Left arm, BP Patient Position: Sitting) Pulse 80 Temp 98 °F (36.7 °C) (Oral) Resp 18 Ht 5' 4\" (1.626 m) Wt 123 lb (55.8 kg) LMP 01/12/2019 SpO2 92% BMI 21.11 kg/m² Physical Exam  
Constitutional: She is oriented to person, place, and time. She appears well-developed and well-nourished. HENT:  
Head: Normocephalic. Right Ear: Tympanic membrane and ear canal normal.  
Left Ear: Tympanic membrane and ear canal normal.  
Mouth/Throat: Oropharynx is clear and moist.  
Eyes: Conjunctivae and EOM are normal. Pupils are equal, round, and reactive to light. Neck: Neck supple. Cardiovascular: Normal rate, regular rhythm, normal heart sounds and intact distal pulses. Pulmonary/Chest: Effort normal and breath sounds normal.  
Abdominal: Soft. There is no tenderness. Musculoskeletal: She exhibits no tenderness. Lymphadenopathy:  
  She has no cervical adenopathy. Neurological: She is alert and oriented to person, place, and time. Skin: Skin is warm and dry. Psychiatric: She has a normal mood and affect. Her behavior is normal.  
Nursing note and vitals reviewed. Vitals 2/1/2019 8/17/2017 8/9/2017 3/10/2016 10/30/2015 Blood Pressure 105/66 98/70 102/70 86/48 98/68 Pulse 80 72 78 73 69 Vitals 2/25/2014 1/28/2014 Blood Pressure 112/68 109/73 Pulse 69 70 ASSESSMENT and PLAN 
  ICD-10-CM ICD-9-CM 1. Malaise and fatigue R53.81 780.79 CBC WITH AUTOMATED DIFF B06.57  METABOLIC PANEL, COMPREHENSIVE  
   TSH 3RD GENERATION Reassured that relatively low blood pressure is baseline for her and not of concern in the absence of associated symptoms Rest, increase fluids Return for lab Schedule lab review and follow up with Dr. Lisset Hernandez

## 2019-02-06 ENCOUNTER — HOSPITAL ENCOUNTER (OUTPATIENT)
Dept: LAB | Age: 44
Discharge: HOME | End: 2019-02-06
Payer: COMMERCIAL

## 2019-02-06 DIAGNOSIS — R53.83 MALAISE AND FATIGUE: ICD-10-CM

## 2019-02-06 DIAGNOSIS — R53.81 MALAISE AND FATIGUE: ICD-10-CM

## 2019-02-06 LAB
BASOPHILS # BLD: 0 K/UL (ref 0–0.1)
BASOPHILS NFR BLD: 1 % (ref 0–2)
DIFFERENTIAL METHOD BLD: ABNORMAL
EOSINOPHIL # BLD: 0.1 K/UL (ref 0–0.4)
EOSINOPHIL NFR BLD: 1 % (ref 0–5)
ERYTHROCYTE [DISTWIDTH] IN BLOOD BY AUTOMATED COUNT: 12.8 % (ref 11.6–14.5)
HCT VFR BLD AUTO: 41.8 % (ref 35–45)
HGB BLD-MCNC: 13.3 G/DL (ref 12–16)
LYMPHOCYTES # BLD: 1.8 K/UL (ref 0.9–3.6)
LYMPHOCYTES NFR BLD: 31 % (ref 21–52)
MCH RBC QN AUTO: 31.1 PG (ref 24–34)
MCHC RBC AUTO-ENTMCNC: 31.8 G/DL (ref 31–37)
MCV RBC AUTO: 97.7 FL (ref 74–97)
MONOCYTES # BLD: 0.6 K/UL (ref 0.05–1.2)
MONOCYTES NFR BLD: 11 % (ref 3–10)
NEUTS SEG # BLD: 3.2 K/UL (ref 1.8–8)
NEUTS SEG NFR BLD: 56 % (ref 40–73)
PLATELET # BLD AUTO: 184 K/UL (ref 135–420)
PMV BLD AUTO: 12 FL (ref 9.2–11.8)
RBC # BLD AUTO: 4.28 M/UL (ref 4.2–5.3)
TSH SERPL DL<=0.05 MIU/L-ACNC: 1.3 UIU/ML (ref 0.36–3.74)
WBC # BLD AUTO: 5.8 K/UL (ref 4.6–13.2)

## 2019-02-06 PROCEDURE — 36415 COLL VENOUS BLD VENIPUNCTURE: CPT

## 2019-02-06 PROCEDURE — 84443 ASSAY THYROID STIM HORMONE: CPT

## 2019-02-06 PROCEDURE — 80053 COMPREHEN METABOLIC PANEL: CPT

## 2019-02-06 PROCEDURE — 85025 COMPLETE CBC W/AUTO DIFF WBC: CPT

## 2019-02-07 LAB
ALBUMIN SERPL-MCNC: 3.9 G/DL (ref 3.4–5)
ALBUMIN/GLOB SERPL: 1 {RATIO} (ref 0.8–1.7)
ALP SERPL-CCNC: 44 U/L (ref 45–117)
ALT SERPL-CCNC: 32 U/L (ref 13–56)
ANION GAP SERPL CALC-SCNC: 9 MMOL/L (ref 3–18)
AST SERPL-CCNC: 24 U/L (ref 15–37)
BILIRUB SERPL-MCNC: 0.5 MG/DL (ref 0.2–1)
BUN SERPL-MCNC: 7 MG/DL (ref 7–18)
BUN/CREAT SERPL: 11 (ref 12–20)
CALCIUM SERPL-MCNC: 8.9 MG/DL (ref 8.5–10.1)
CHLORIDE SERPL-SCNC: 104 MMOL/L (ref 100–108)
CO2 SERPL-SCNC: 25 MMOL/L (ref 21–32)
CREAT SERPL-MCNC: 0.65 MG/DL (ref 0.6–1.3)
GLOBULIN SER CALC-MCNC: 4.1 G/DL (ref 2–4)
GLUCOSE SERPL-MCNC: 81 MG/DL (ref 74–99)
POTASSIUM SERPL-SCNC: 4.1 MMOL/L (ref 3.5–5.5)
PROT SERPL-MCNC: 8 G/DL (ref 6.4–8.2)
SODIUM SERPL-SCNC: 138 MMOL/L (ref 136–145)

## 2019-02-11 ENCOUNTER — TELEPHONE (OUTPATIENT)
Dept: FAMILY MEDICINE CLINIC | Age: 44
End: 2019-02-11

## 2019-02-11 NOTE — TELEPHONE ENCOUNTER
Contacted patient to reschedule her appointment for 2/13/19 due to provider out of office. Patient requests that a nurse call her with her lab/test results.

## 2019-02-13 NOTE — TELEPHONE ENCOUNTER
Patient was called on 02/07/19 with lab results from BARBY Godoy LPN, called patient back with results and she was asking about what her levels were, printed the lab flowsheet for the patient to be mailed out, patient verbalized understanding.

## 2021-08-24 ENCOUNTER — HOSPITAL ENCOUNTER (OUTPATIENT)
Dept: PHYSICAL THERAPY | Age: 46
Discharge: HOME | End: 2021-08-24
Payer: COMMERCIAL

## 2021-08-24 PROCEDURE — 97162 PT EVAL MOD COMPLEX 30 MIN: CPT

## 2021-08-24 PROCEDURE — 97535 SELF CARE MNGMENT TRAINING: CPT

## 2021-08-24 PROCEDURE — 97110 THERAPEUTIC EXERCISES: CPT

## 2021-08-24 NOTE — PROGRESS NOTES
[facility] PHYSICAL THERAPY  [address] [place] [address] [address],Vickie Poole, [address] [facility] - Phone: [phone]  Fax: [phone] / 7597 [facility]  Patient Name: Jesus Linton : 1975   Medical   Diagnosis: Low back pain with radiculopathy Treatment Diagnosis: Low back pain [M54.5]   Onset Date: 2021     Referral Source: LORA Cherry Start of Care [facility]): 2021   Prior Hospitalization: See medical history Provider #: [id number]   Prior Level of Function: No pain with standing, walking, bending, or squatting   Comorbidities: Alcohol use, Lupus   Medications: Verified on Patient Summary List   The Plan of Care and following information is based on the information from the initial evaluation.   ===========================================================================================  Assessment / key information:  Jesus Linton is a 39 y.o. female with Dx: low back pain after having microdiscectomy in 2020 and injection in 2020. 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 Pain started again in , this time going down the R anterior thigh above the knee. 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 Pt notes taking Celebrex and heat helps pain. Reports difficulty standing/walking longer than 10min, bending, squatting, driving for long periods. Pt rates pain as 7/10 max, 3/10 at best, 4 currently. Objective: FOTO score = 16 (an established functional score where 100 = no disability). Palpation: TTP along R QL, B lumbar paraspinals, L4 and L5 spinous process, B PSIS, B piriformis, B glute max, B glute med. . Lumbar ROM: Flexion finger tips to floor, Extension 75% limited, SB R 25% limited L 25% limited, Rot R 25% limited L 25% limited. Hip strength grossly 3+/5 throughout BLE. TA contraction good-.  Posture: L hip initially noted to be anteriorly rotated with long sit test, however after sitting up and laying back down, hips noted to be symmetrical again. Current deficits include decreased stability in core and hip with decreased strength and ROM causing difficulty with prolonged positions, bending, squatting, and ADLs. Pt demonstrated understanding of HEP. Pt will benefit from a comprehensive POC/HEP to address impairments and restore function in order to return to prior level of function and prevent secondary impairments.  ===========================================================================================  Eval Complexity: History MEDIUM  Complexity : 1-2 comorbidities / personal factors will impact the outcome/ POC ;  Examination  HIGH Complexity : 4+ Standardized tests and measures addressing body structure, function, activity limitation and / or participation in recreation ; Presentation MEDIUM Complexity : Evolving with changing characteristics ; Decision Making HIGH Complexity : FOTO score of 1- 25 ; Overall Complexity MEDIUM  Problem List: pain affecting function, decrease ROM, decrease strength, decrease ADL/ functional abilitiies, decrease activity tolerance, decrease flexibility/ joint mobility and decrease transfer abilities   Treatment Plan may include any combination of the following: Therapeutic exercise, Therapeutic activities, Neuromuscular re-education, Physical agent/modality, Gait/balance training, Manual therapy, Aquatic therapy, Patient education, Self Care training, Functional mobility training, Home safety training and Stair training  Patient / Family readiness to learn indicated by: asking questions, trying to perform skills and interest  Persons(s) to be included in education: patient (P)  Barriers to Learning/Limitations: None  Measures taken, if barriers to learning:    Patient Goal (s): Pain relief    Patient self reported health status: good  Rehabilitation Potential: good   Short Term Goals:  To be accomplished in  3  weeks:  1. Independent with HEP to progress to meet goals. 2. Pt to report max pain levels less than 4/10 for ease of ADLs.  Long Term Goals: To be accomplished in  6  weeks:  1. Improve FOTO score to 45/100 to show a significant functional change. 2. Pt to report walking 30min with pain less than 3/10 for improved QoL. 3. Pt to demonstrate global hip strength to 4+/5 to improve stability with transfers. Frequency / Duration:   Patient to be seen  2-3  times per week for 6  weeks:  Patient / Caregiver education and instruction: self care, activity modification and exercises  Therapist Signature: Doris Moe PT, DPT Date: 8/96/9354   Certification Period: na Time: 12:59 PM   ===========================================================================================  I certify that the above Physical Therapy Services are being furnished while the patient is under my care. I agree with the treatment plan and certify that this therapy is necessary. Physician Signature:        Date:       Time:                                        LORA Morgan  Please sign and return to [address] [place] [address] Physical Therapy at [facility] or you may fax the signed copy to [phone]. Thank you.

## 2021-08-24 NOTE — PROGRESS NOTES
PHYSICAL THERAPY - DAILY TREATMENT NOTE    Patient Name: Elsy Joseph        Date: 2021  : 1975   yes Patient  Verified  Visit #:   1     Insurance: Payor: [employer] aRe / Plan: [facility] PPO / Product Type: PPO /      In time: 1348 Out time: 8814   Total Treatment Time: 39     Medicare/[facility] Time Tracking (below)   Total Timed Codes (min):  25 1:1 Treatment Time:  39     TREATMENT AREA =  Low back pain [M54.5]    SUBJECTIVE  Pain Level (on 0 to 10 scale):  4  / 10   Medication Changes/New allergies or changes in medical history, any new surgeries or procedures?    no  If yes, update Summary List   Subjective Functional Status/Changes:  []  No changes reported     See POC          OBJECTIVE  15 min Therapeutic Exercise:  [x]  See flow sheet   Rationale:      increase strength and improve coordination to improve the patients ability to perform transfers and ADLs. 10 min Self Care: Review HEP, POC, diagnosis and prognosis. Rationale:    increase ROM, increase strength and improve coordination to improve the patients ability to perform HEP safely at home and improve understanding of PT POC. Billed With/As:   [] TE   [] TA   [] Neuro   [x] Self Care Patient Education: [x] Review HEP    [] Progressed/Changed HEP based on:   [] positioning   [] body mechanics   [] transfers   [] heat/ice application    [] other:      Other Objective/Functional Measures:    Shown and performed HEP     Post Treatment Pain Level (on 0 to 10) scale:   4  / 10     ASSESSMENT  Assessment/Changes in Function:     See POC     []  See Progress Note/Recertification   Patient will continue to benefit from skilled PT services to modify and progress therapeutic interventions and analyze and cue movement patterns to attain remaining goals.    Progress toward goals / Updated goals:    See POC     PLAN  [x]  Upgrade activities as tolerated yes Continue plan of care   []  Discharge due to :    []  Other: Frequency: 2-3x/week for 6 weeks     Therapist: Bob Nation PT, DPT    Date: 8/24/2021 Time: 12:59 PM     Future Appointments   Date Time Provider Jayden Quinn   8/24/2021 12:00 PM [facility]

## 2021-09-10 ENCOUNTER — HOSPITAL ENCOUNTER (OUTPATIENT)
Dept: PHYSICAL THERAPY | Age: 46
Discharge: HOME | End: 2021-09-10
Payer: COMMERCIAL

## 2021-09-10 PROCEDURE — 97535 SELF CARE MNGMENT TRAINING: CPT

## 2021-09-10 PROCEDURE — 97112 NEUROMUSCULAR REEDUCATION: CPT

## 2021-09-10 PROCEDURE — 97012 MECHANICAL TRACTION THERAPY: CPT

## 2021-09-10 PROCEDURE — 97110 THERAPEUTIC EXERCISES: CPT

## 2021-09-10 NOTE — PROGRESS NOTES
PHYSICAL THERAPY - DAILY TREATMENT NOTE    Patient Name: Jing Kennedy        Date: 9/10/2021  : 1975   yes Patient  Verified  Visit #:   2   of     Insurance: Payor: Lizzie Montano / Plan: [facility] PPO / Product Type: PPO /      In time: 815 Out time: 906   Total Treatment Time: 51     Medicare/[employer] Time Tracking (below)   Total Timed Codes (min):  39 1:1 Treatment Time:  39     TREATMENT AREA =  Low back pain [M54.5]    SUBJECTIVE  Pain Level (on 0 to 10 scale):  7  / 10   Medication Changes/New allergies or changes in medical history, any new surgeries or procedures?    no  If yes, update Summary List   Subjective Functional Status/Changes:  []  No changes reported     Pt reports she had a death in the family and had been traveling a lot; didn't get a chance to do any of the HEP because she was so busy,         OBJECTIVE  Modalities Rationale:     decrease pain and increase tissue extensibility to improve patient's ability to perform ADLs.    min [] Estim, type/location:                                      []  att     []  unatt     []  w/US     []  w/ice    []  w/heat   12 min [x]  Mechanical Traction: type/lbs 12min 35lbs to 70lbs 1:1                  []  pro   [x]  sup   [x]  int   []  cont    []  before manual    []  after manual    min []  Ultrasound, settings/location:      min []  Iontophoresis w/ dexamethasone, location:                                               []  take home patch       []  in clinic    min []  Ice     []  Heat    location/position:     min []  Vasopneumatic Device, press/temp:    If using vaso (only need to measure limb vaso being performed on)      pre-treatment girth :       post-treatment girth :       measured at (landmark location) :      min []  Other:    [x] Skin assessment post-treatment (if applicable):    [x]  intact    []  redness- no adverse reaction                  []redness  adverse reaction:        19 min Therapeutic Exercise:  [x] See flow sheet   Rationale:      increase strength and improve coordination to improve the patients ability to perform transfers and ADLs. 10 min Neuromuscular Re-ed: [x]  See flow sheet   Rationale:    improve coordination to improve the patients ability to recruit TA for increased stability in ADLs. 10 min Self Care: Review HEP, POC, diagnosis and prognosis. Rationale:    increase ROM, increase strength and improve coordination to improve the patients ability to perform HEP safely at home and improve understanding of PT POC. Billed With/As:   [] TE   [] TA   [] Neuro   [x] Self Care Patient Education: [x] Review HEP    [] Progressed/Changed HEP based on:   [] positioning   [] body mechanics   [] transfers   [] heat/ice application    [] other:      Other Objective/Functional Measures:    See flow sheet for exercises done     Post Treatment Pain Level (on 0 to 10) scale:   4  / 10     ASSESSMENT  Assessment/Changes in Function:     Good tolerance to first session since IE. Exercises done to stabilize core f/b lumbar traction. Pt reported good tolerance to L intermittent traction. Begin MT NV.     []  See Progress Note/Recertification   Patient will continue to benefit from skilled PT services to modify and progress therapeutic interventions and analyze and cue movement patterns to attain remaining goals. Progress toward goals / Updated goals: · Short Term Goals: To be accomplished in  3  weeks:  1. Independent with HEP to progress to meet goals. 2. Pt to report max pain levels less than 4/10 for ease of ADLs. · Long Term Goals: To be accomplished in  6  weeks:  1. Improve FOTO score to 45/100 to show a significant functional change. 2. Pt to report walking 30min with pain less than 3/10 for improved QoL. 3. Pt to demonstrate global hip strength to 4+/5 to improve stability with transfers.        PLAN  [x]  Upgrade activities as tolerated yes Continue plan of care   []  Discharge due to :    [] Other:      Therapist: Toya Moreno, PT, DPT    Date: 9/10/2021 Time: 9:59 AM     Future Appointments   Date Time Provider Jayden Quinn   9/10/2021  8:00 AM Moy Gunter PT [id number]

## 2021-10-07 NOTE — PROGRESS NOTES
[address] THERAPY  [address] [facility] [address],[facility], [address] [facility] - Phone: [phone]  Fax: [phone]    DISCHARGE NOTE  Patient Name: Adi Jamison : 1975   Treatment/Medical Diagnosis: Low back pain [M54.5]   Referral Source: Reji Rowe [facility]     Date of Initial Visit: 21 Attended Visits: 2 Missed Visits: 1       SUMMARY OF TREATMENT  Pt attended only initial evaluation and     1     follow-up and then did not return. Therefore a formal reassessment of goals was not performed. RECOMMENDATIONS  Discontinue physical therapy due to patient not returning. If you have any questions/comments please contact us directly at [phone]. Thank you for allowing us to assist in the care of your patient.     Therapist Signature: Tracey Serrano PT, DPT Date: 10/7/21     Time: 12:15 PM

## 2022-03-18 ENCOUNTER — APPOINTMENT (OUTPATIENT)
Dept: PHYSICAL THERAPY | Age: 47
End: 2022-03-18

## 2022-03-25 ENCOUNTER — APPOINTMENT (OUTPATIENT)
Dept: PHYSICAL THERAPY | Age: 47
End: 2022-03-25

## 2022-04-15 ENCOUNTER — TELEPHONE (OUTPATIENT)
Dept: PHYSICAL THERAPY | Age: 47
End: 2022-04-15

## 2022-04-18 ENCOUNTER — TELEPHONE (OUTPATIENT)
Dept: PHYSICAL THERAPY | Age: 47
End: 2022-04-18

## 2022-04-18 NOTE — TELEPHONE ENCOUNTER
Left voicemail for patient to call back in regards to insurance it is e-rejecting let patient know may need to cancel appointment if we do not get a call back

## 2022-04-19 ENCOUNTER — APPOINTMENT (OUTPATIENT)
Dept: PHYSICAL THERAPY | Age: 47
End: 2022-04-19